# Patient Record
Sex: MALE | Race: WHITE
[De-identification: names, ages, dates, MRNs, and addresses within clinical notes are randomized per-mention and may not be internally consistent; named-entity substitution may affect disease eponyms.]

---

## 2017-03-06 ENCOUNTER — HOSPITAL ENCOUNTER (OUTPATIENT)
Dept: HOSPITAL 80 - BHFA | Age: 82
End: 2017-03-06
Attending: INTERNAL MEDICINE
Payer: COMMERCIAL

## 2017-03-06 DIAGNOSIS — I25.810: Primary | ICD-10-CM

## 2017-03-06 DIAGNOSIS — R06.02: ICD-10-CM

## 2017-03-06 DIAGNOSIS — N18.9: ICD-10-CM

## 2017-03-06 DIAGNOSIS — R53.83: ICD-10-CM

## 2017-03-06 DIAGNOSIS — I50.32: ICD-10-CM

## 2017-03-06 DIAGNOSIS — F32.9: ICD-10-CM

## 2017-03-06 DIAGNOSIS — R60.9: ICD-10-CM

## 2017-08-31 ENCOUNTER — HOSPITAL ENCOUNTER (OUTPATIENT)
Dept: HOSPITAL 80 - BHFA | Age: 82
End: 2017-08-31
Attending: INTERNAL MEDICINE
Payer: COMMERCIAL

## 2017-08-31 DIAGNOSIS — I25.10: ICD-10-CM

## 2017-08-31 DIAGNOSIS — I50.9: Primary | ICD-10-CM

## 2017-11-02 NOTE — GHP
[f rep st]



                                                       PREOP HISTORY AND PHYSICAL





DATE OF ADMISSION:  11/03/2017



CHIEF COMPLAINT:  Left inguinal hernia.



HISTORY OF PRESENT ILLNESS:  The patient is an 87-year-old who noticed a bulge in his left groin with
 pain while walking approximately 10 days ago.  He does have constant discomfort and awareness of the
 area.  He periodically has terrible pain that will go away within 3-5 minutes.  He does not have any
 difficulty with bowel or bladder habits.



PAST MEDICAL HISTORY:  Congestive heart failure, asthma, automatic implantable cardioverter-defibrill
ator in situ, chronic kidney disease, chronic pulmonary hypertension, chronic renal disease, history 
of myocardial infarction, hyperlipidemia, hypothyroidism, status post stents.



PAST SURGICAL HISTORY:  AICD placement, stents.



MEDICATIONS:  Aldactone, aspirin, Ativan, atorvastatin, Coreg, __________, Entresto, fluticasone, Las
ix, levothyroxine, Nitrostat, ProAir, Symbicort, Zoloft.



ALLERGIES:  No known drug allergies.



SOCIAL HISTORY:  He has never used tobacco.  He is a retired .



REVIEW OF SYSTEMS:  Decreasing exercise tolerance.  Otherwise, 10-point review of systems negative.



PHYSICAL EXAMINATION:  GENERAL:  Pleasant, well-nourished, well-groomed man.  Appears younger than st
ated age.  HEENT:  Normocephalic.  No gross hearing deficits.  Mucous membranes moist.  Pupils equal 
and round.  No scleral icterus.  LUNGS:  Clear to auscultation bilaterally.  CARDIAC:  Defibrillator 
in left upper chest.  Regular rate.  ABDOMEN:  Bowel sounds present.  Soft, nontender.  Bulge in left
 groin.  Cough impulse right groin.  SKIN:  Warm and dry.  MUSCULOSKELETAL:  Normal gait.  Normal olayinka
ls.  PSYCHIATRIC:  Mood and affect normal.  NEUROLOGIC:  Tremor.



IMPRESSION AND PLAN:  An 87-year-old with symptomatic left inguinal hernia and likely right inguinal 
hernia.  Due to his multiple comorbidities, I think open approach with sedation would be better for h
im than a laparoscopic repair.  Risks and benefits including, but not limited to, stroke, heart attac
k, death, blood clots, infection, bleeding, and damage to surrounding structures such as the bowel, b
ladder and testicle were discussed.





Job #:  [id number]

## 2017-11-02 NOTE — GHP
[f rep st]



                                                       PREOP HISTORY AND PHYSICAL





DATE OF ADMISSION:  11/03/2017



CHIEF COMPLAINT:  Left inguinal hernia.



HISTORY OF PRESENT ILLNESS:  The patient is an 87-year-old who noticed a bulge in his left groin with
 pain while walking approximately 10 days ago.  He does have constant discomfort and awareness of the
 area.  He periodically has terrible pain that will go away within 3-5 minutes.  He does not have any
 difficulty with bowel or bladder habits.



PAST MEDICAL HISTORY:  Congestive heart failure, asthma, automatic implantable cardioverter-defibrill
ator in situ, chronic kidney disease, chronic pulmonary hypertension, chronic renal disease, history 
of myocardial infarction, hyperlipidemia, hypothyroidism, status post stents.



PAST SURGICAL HISTORY:  AICD placement, stents.



MEDICATIONS:  Aldactone, aspirin, Ativan, atorvastatin, Coreg, __________, Entresto, fluticasone, Las
ix, levothyroxine, Nitrostat, ProAir, Symbicort, Zoloft.



ALLERGIES:  No known drug allergies.



SOCIAL HISTORY:  He has never used tobacco.  He is a retired .



REVIEW OF SYSTEMS:  Decreasing exercise tolerance.  Otherwise, 10-point review of systems negative.



PHYSICAL EXAMINATION:  GENERAL:  Pleasant, well-nourished, well-groomed man.  Appears younger than st
ated age.  HEENT:  Normocephalic.  No gross hearing deficits.  Mucous membranes moist.  Pupils equal 
and round.  No scleral icterus.  LUNGS:  Clear to auscultation bilaterally.  CARDIAC:  Defibrillator 
in left upper chest.  Regular rate.  ABDOMEN:  Bowel sounds present.  Soft, nontender.  Bulge in left
 groin.  Cough impulse right groin.  SKIN:  Warm and dry.  MUSCULOSKELETAL:  Normal gait.  Normal olayinka
ls.  PSYCHIATRIC:  Mood and affect normal.  NEUROLOGIC:  Tremor.



IMPRESSION AND PLAN:  An 87-year-old with symptomatic left inguinal hernia and likely right inguinal 
hernia.  Due to his multiple comorbidities, I think open approach with sedation would be better for h
im than a laparoscopic repair.  Risks and benefits including, but not limited to, stroke, heart attac
k, death, blood clots, infection, bleeding, and damage to surrounding structures such as the bowel, b
ladder and testicle were discussed.





Job #:  [id number]

## 2017-11-02 NOTE — GHP
[f rep st]



                                                       PREOP HISTORY AND PHYSICAL





DATE OF ADMISSION:  11/03/2017



CHIEF COMPLAINT:  Left inguinal hernia.



HISTORY OF PRESENT ILLNESS:  The patient is an 87-year-old who noticed a bulge in his left groin with
 pain while walking approximately 10 days ago.  He does have constant discomfort and awareness of the
 area.  He periodically has terrible pain that will go away within 3-5 minutes.  He does not have any
 difficulty with bowel or bladder habits.



PAST MEDICAL HISTORY:  Congestive heart failure, asthma, automatic implantable cardioverter-defibrill
ator in situ, chronic kidney disease, chronic pulmonary hypertension, chronic renal disease, history 
of myocardial infarction, hyperlipidemia, hypothyroidism, status post stents.



PAST SURGICAL HISTORY:  AICD placement, stents.



MEDICATIONS:  Aldactone, aspirin, Ativan, atorvastatin, Coreg, __________, Entresto, fluticasone, Las
ix, levothyroxine, Nitrostat, ProAir, Symbicort, Zoloft.



ALLERGIES:  No known drug allergies.



SOCIAL HISTORY:  He has never used tobacco.  He is a retired .



REVIEW OF SYSTEMS:  Decreasing exercise tolerance.  Otherwise, 10-point review of systems negative.



PHYSICAL EXAMINATION:  GENERAL:  Pleasant, well-nourished, well-groomed man.  Appears younger than st
ated age.  HEENT:  Normocephalic.  No gross hearing deficits.  Mucous membranes moist.  Pupils equal 
and round.  No scleral icterus.  LUNGS:  Clear to auscultation bilaterally.  CARDIAC:  Defibrillator 
in left upper chest.  Regular rate.  ABDOMEN:  Bowel sounds present.  Soft, nontender.  Bulge in left
 groin.  Cough impulse right groin.  SKIN:  Warm and dry.  MUSCULOSKELETAL:  Normal gait.  Normal olayinka
ls.  PSYCHIATRIC:  Mood and affect normal.  NEUROLOGIC:  Tremor.



IMPRESSION AND PLAN:  An 87-year-old with symptomatic left inguinal hernia and likely right inguinal 
hernia.  Due to his multiple comorbidities, I think open approach with sedation would be better for h
im than a laparoscopic repair.  Risks and benefits including, but not limited to, stroke, heart attac
k, death, blood clots, infection, bleeding, and damage to surrounding structures such as the bowel, b
ladder and testicle were discussed.





Job #:  [id number]

## 2017-11-03 ENCOUNTER — HOSPITAL ENCOUNTER (OUTPATIENT)
Dept: HOSPITAL 80 - FSGY | Age: 82
Discharge: HOME | End: 2017-11-03
Attending: SURGERY
Payer: COMMERCIAL

## 2017-11-03 VITALS
DIASTOLIC BLOOD PRESSURE: 55 MMHG | SYSTOLIC BLOOD PRESSURE: 114 MMHG | OXYGEN SATURATION: 99 % | RESPIRATION RATE: 16 BRPM

## 2017-11-03 VITALS — HEART RATE: 65 BPM

## 2017-11-03 VITALS — TEMPERATURE: 97.5 F

## 2017-11-03 DIAGNOSIS — E78.5: ICD-10-CM

## 2017-11-03 DIAGNOSIS — I25.10: ICD-10-CM

## 2017-11-03 DIAGNOSIS — I25.5: ICD-10-CM

## 2017-11-03 DIAGNOSIS — I27.20: ICD-10-CM

## 2017-11-03 DIAGNOSIS — K40.31: Primary | ICD-10-CM

## 2017-11-03 DIAGNOSIS — Z66: ICD-10-CM

## 2017-11-03 DIAGNOSIS — Z79.82: ICD-10-CM

## 2017-11-03 DIAGNOSIS — Z95.810: ICD-10-CM

## 2017-11-03 DIAGNOSIS — Z95.5: ICD-10-CM

## 2017-11-03 DIAGNOSIS — R10.32: ICD-10-CM

## 2017-11-03 DIAGNOSIS — I25.2: ICD-10-CM

## 2017-11-03 DIAGNOSIS — N18.3: ICD-10-CM

## 2017-11-03 DIAGNOSIS — F32.9: ICD-10-CM

## 2017-11-03 DIAGNOSIS — E03.9: ICD-10-CM

## 2017-11-03 DIAGNOSIS — I50.22: ICD-10-CM

## 2017-11-03 LAB — PLATELET # BLD: 136 10^3/UL (ref 150–400)

## 2017-11-03 PROCEDURE — 0YU60JZ SUPPLEMENT LEFT INGUINAL REGION WITH SYNTHETIC SUBSTITUTE, OPEN APPROACH: ICD-10-PCS | Performed by: SURGERY

## 2017-11-03 PROCEDURE — C1781 MESH (IMPLANTABLE): HCPCS

## 2017-11-03 NOTE — PDANEPAE
ANE History of Present Illness





Patient presents for open inguinal hernia repair





ANE Past Medical History





- Cardiovascular History


Hx Hypertension: No


Hx Arrhythmias: No


Hx Chest Pain: No


Hx Coronary Artery / Peripheral Vascular Disease: Yes


Hx CHF / Valvular Disease: No


Hx Palpitations: No


Cardiovascular History Comment: MI 1984





- Pulmonary History


Hx Asthma/Reactive Airway Disease: Yes


Hx Recent Upper Respiratory Infection: No


Hx Oxygen in Use at Home: No


Hx Sleep Apnea: No


Sleep Apnea Screening Result - Last Documented: Positive





- Neurologic History


Hx Cerebrovascular Accident: No


Hx Seizures: No


Hx Dementia: No





- Endocrine History


Hx Diabetes: No





- Renal History


Hx Renal Disorders: Yes


Renal History Comment: CRF





- Liver History


Hx Hepatic Disorders: No





- Neurological & Psychiatric Hx


Hx Neurological and Psychiatric Disorders: No





- Cancer History


Hx Cancer: No





- Congenital Disorder History


Hx Congenital Disorders: No





- GI History


Hx Gastrointestinal Disorders: No





- Other Health History


Other Health History: EASILY BRUISED





- Chronic Pain History


Chronic Pain: Yes





- Surgical History


Prior Surgeries: none





ANE Review of Systems


Review of Systems: 








- Exercise capacity


METS (RN): 4 METS





- Pacemaker


Pacemaker Type: Permanent Pacer/Defib


Pacemaker : Usentric


Pacemaker Mode: DDD


Pacemaker Set Rate: 65


Date Pacemaker Last Checked: 10/18/17





ANE Patient History





- Allergies


Allergies/Adverse Reactions: 








No Known Allergies Allergy (Verified 06/13/15 19:04)


 








- Home Medications


Home medications: home medication list seen and reviewed


Home Medications: 








Atorvastatin Calcium [Lipitor 80 mg] 80 mg PO DAILY@18 04/25/14 [Last Taken 08/ 31/16 06:00]


Cholecalciferol Vit D3 [Vitamin D3 (*)] 2,000 units PO DAILY 04/25/14 [Last 

Taken 08/30/16 06:00]


Clopidogrel Bisulfate [Plavix (*)] 75 mg PO DAILY@18 04/25/14 [Last Taken 08/30/ 16 06:00]


Multivitamins [Multivitamin (*)] 1 tab PO DAILY 04/25/14 [Last Taken 08/30/16 06

:00]


Omega-3 Fatty Acids [Fish Oil 1000 mg (*)] 1,000 mg PO DAILY 04/25/14 [Last 

Taken 08/30/16 06:00]


Albuterol [Proventil Inhaler HFA (*)] 2 puffs IH Q4 PRN 06/13/15 [Last Taken 08/ 29/16 13:00]


Calcium Carbonate [Oyster Shell Calcium 500 mg (*)] 500 mg PO DAILY 06/13/15 [

Last Taken 08/30/16 06:00]


Carvedilol [Coreg (*)] 3.125 mg PO BID@07,18 06/13/15 [Last Taken 08/31/16 06:00

]


Digoxin [Lanoxin 0.125 mg] 0.125 mg PO DAILY 06/13/15 [Last Taken 08/31/16 06:00

]


Fluticasone Nasal [Flonase Nasal Spray] 1 sprays EACHNARE BID 06/13/15 [Last 

Taken 08/31/16 06:00]


Spironolactone [Aldactone 25 MG (*)] 12.5 mg PO BIDDIUR 06/13/15 [Last Taken 08/ 31/16 06:00]


Aspirin EC [Aspirin EC 81 mg (*)] 81 mg PO DAILY 03/18/16 [Last Taken 08/24/16 

06:00]


Budesonide/Formoterol 160/4.5 [Symbicort 160-4.5 Mcg Inh (*)] 1 puffs IH BID 03/ 18/16 [Last Taken 08/31/16 06:00]


Furosemide [Lasix 20 MG (*)] 20 mg PO BIDDIUR 03/18/16 [Last Taken 08/30/16 12:

00]


LORazepam [Ativan (*)] 1 mg PO HS PRN 03/18/16 [Last Taken 08/30/16 23:00]


Nitroglycerin [Nitrostat 0.4 mg (*)] 0.4 mg SL Q5M PRN 03/18/16 [Last Taken 05/ 31/16 18:00]


Entresto 24 mg/26 mg (RX) 1 PO BID 08/31/16 [Last Taken 08/31/16 06:00]








- NPO status


NPO Status: no food or drink >8 hours





- Smoking Hx


Smoking Status: Never smoked





- Family Anes Hx


Family Hx Anesthesia Complications: none





ANE Labs/Vital Signs





- Vital Signs


Height: 175.26 cm


Weight: 63.503 kg





ANE Physical Exam





- Airway


Neck exam: FROM, decreased ROM


Mallampati Score: Class 2


Mouth exam: normal dental/mouth exam





- Pulmonary


Pulmonary: no respiratory distress





- Cardiovascular


Cardiovascular: regular rate and rhythym





- ASA Status


ASA Status: IV





ANE Anesthesia Plan


Anesthesia Plan: MAC (MAC, surgeon block. RBA discussed. )

## 2017-11-03 NOTE — PDANEPAE
ANE History of Present Illness





Patient presents for open inguinal hernia repair





ANE Past Medical History





- Cardiovascular History


Hx Hypertension: No


Hx Arrhythmias: No


Hx Chest Pain: No


Hx Coronary Artery / Peripheral Vascular Disease: Yes


Hx CHF / Valvular Disease: No


Hx Palpitations: No


Cardiovascular History Comment: MI 1984





- Pulmonary History


Hx Asthma/Reactive Airway Disease: Yes


Hx Recent Upper Respiratory Infection: No


Hx Oxygen in Use at Home: No


Hx Sleep Apnea: No


Sleep Apnea Screening Result - Last Documented: Positive





- Neurologic History


Hx Cerebrovascular Accident: No


Hx Seizures: No


Hx Dementia: No





- Endocrine History


Hx Diabetes: No





- Renal History


Hx Renal Disorders: Yes


Renal History Comment: CRF





- Liver History


Hx Hepatic Disorders: No





- Neurological & Psychiatric Hx


Hx Neurological and Psychiatric Disorders: No





- Cancer History


Hx Cancer: No





- Congenital Disorder History


Hx Congenital Disorders: No





- GI History


Hx Gastrointestinal Disorders: No





- Other Health History


Other Health History: EASILY BRUISED





- Chronic Pain History


Chronic Pain: Yes





- Surgical History


Prior Surgeries: none





ANE Review of Systems


Review of Systems: 








- Exercise capacity


METS (RN): 4 METS





- Pacemaker


Pacemaker Type: Permanent Pacer/Defib


Pacemaker : [employer]


Pacemaker Mode: DDD


Pacemaker Set Rate: 65


Date Pacemaker Last Checked: 10/18/17





ANE Patient History





- Allergies


Allergies/Adverse Reactions: 








No Known Allergies Allergy (Verified 06/13/15 19:04)


 








- Home Medications


Home medications: home medication list seen and reviewed


Home Medications: 








Atorvastatin Calcium [Lipitor 80 mg] 80 mg PO DAILY@18 04/25/14 [Last Taken 08/ 31/16 06:00]


Cholecalciferol Vit D3 [Vitamin D3 (*)] 2,000 units PO DAILY 04/25/14 [Last 

Taken 08/30/16 06:00]


Clopidogrel Bisulfate [Plavix (*)] 75 mg PO DAILY@18 04/25/14 [Last Taken 08/30/ 16 06:00]


Multivitamins [Multivitamin (*)] 1 tab PO DAILY 04/25/14 [Last Taken 08/30/16 06

:00]


Omega-3 Fatty Acids [Fish Oil 1000 mg (*)] 1,000 mg PO DAILY 04/25/14 [Last 

Taken 08/30/16 06:00]


Albuterol [Proventil Inhaler HFA (*)] 2 puffs IH Q4 PRN 06/13/15 [Last Taken 08/ 29/16 13:00]


Calcium Carbonate [Oyster Shell Calcium 500 mg (*)] 500 mg PO DAILY 06/13/15 [

Last Taken 08/30/16 06:00]


Carvedilol [Coreg (*)] 3.125 mg PO BID@07,18 06/13/15 [Last Taken 08/31/16 06:00

]


Digoxin [Lanoxin 0.125 mg] 0.125 mg PO DAILY 06/13/15 [Last Taken 08/31/16 06:00

]


Fluticasone Nasal [Flonase Nasal Spray] 1 sprays EACHNARE BID 06/13/15 [Last 

Taken 08/31/16 06:00]


Spironolactone [Aldactone 25 MG (*)] 12.5 mg PO BIDDIUR 06/13/15 [Last Taken 08/ 31/16 06:00]


Aspirin EC [Aspirin EC 81 mg (*)] 81 mg PO DAILY 03/18/16 [Last Taken 08/24/16 

06:00]


Budesonide/Formoterol 160/4.5 [Symbicort 160-4.5 Mcg Inh (*)] 1 puffs IH BID 03/ 18/16 [Last Taken 08/31/16 06:00]


Furosemide [Lasix 20 MG (*)] 20 mg PO BIDDIUR 03/18/16 [Last Taken 08/30/16 12:

00]


LORazepam [Ativan (*)] 1 mg PO HS PRN 03/18/16 [Last Taken 08/30/16 23:00]


Nitroglycerin [Nitrostat 0.4 mg (*)] 0.4 mg SL Q5M PRN 03/18/16 [Last Taken 05/ 31/16 18:00]


Entresto 24 mg/26 mg (RX) 1 PO BID 08/31/16 [Last Taken 08/31/16 06:00]








- NPO status


NPO Status: no food or drink >8 hours





- Smoking Hx


Smoking Status: Never smoked





- Family Anes Hx


Family Hx Anesthesia Complications: none





ANE Labs/Vital Signs





- Vital Signs


Height: 175.26 cm


Weight: 63.503 kg





ANE Physical Exam





- Airway


Neck exam: FROM, decreased ROM


Mallampati Score: Class 2


Mouth exam: normal dental/mouth exam





- Pulmonary


Pulmonary: no respiratory distress





- Cardiovascular


Cardiovascular: regular rate and rhythym





- ASA Status


ASA Status: IV





ANE Anesthesia Plan


Anesthesia Plan: MAC (MAC, surgeon block. RBA discussed. )

## 2017-11-03 NOTE — POSTOPPROG
Post Op Note


Date of Operation: 11/03/17


Surgeon: Dahiana Hernandez


Assistant: levon


Anesthesiologist: angie


Anesthesia: GET(General Endotracheal)


Pre-op Diagnosis: left inguinal hernia


Post-op Diagnosis: same


Indication: 88 yo with painful left inguinal hernia 


Procedure: open left inguinal hernia with mesh


Findings: indirect inguinal hernia


Inf/Abcess present in the surg proc area at time of surgery?: No


EBL: Minimal

## 2017-11-03 NOTE — PDANEPAE
ANE History of Present Illness





Patient presents for open inguinal hernia repair





ANE Past Medical History





- Cardiovascular History


Hx Hypertension: No


Hx Arrhythmias: No


Hx Chest Pain: No


Hx Coronary Artery / Peripheral Vascular Disease: Yes


Hx CHF / Valvular Disease: No


Hx Palpitations: No


Cardiovascular History Comment: MI 1984





- Pulmonary History


Hx Asthma/Reactive Airway Disease: Yes


Hx Recent Upper Respiratory Infection: No


Hx Oxygen in Use at Home: No


Hx Sleep Apnea: No


Sleep Apnea Screening Result - Last Documented: Positive





- Neurologic History


Hx Cerebrovascular Accident: No


Hx Seizures: No


Hx Dementia: No





- Endocrine History


Hx Diabetes: No





- Renal History


Hx Renal Disorders: Yes


Renal History Comment: CRF





- Liver History


Hx Hepatic Disorders: No





- Neurological & Psychiatric Hx


Hx Neurological and Psychiatric Disorders: No





- Cancer History


Hx Cancer: No





- Congenital Disorder History


Hx Congenital Disorders: No





- GI History


Hx Gastrointestinal Disorders: No





- Other Health History


Other Health History: EASILY BRUISED





- Chronic Pain History


Chronic Pain: Yes





- Surgical History


Prior Surgeries: none





ANE Review of Systems


Review of Systems: 








- Exercise capacity


METS (RN): 4 METS





- Pacemaker


Pacemaker Type: Permanent Pacer/Defib


Pacemaker : [employer]


Pacemaker Mode: DDD


Pacemaker Set Rate: 65


Date Pacemaker Last Checked: 10/18/17





ANE Patient History





- Allergies


Allergies/Adverse Reactions: 








No Known Allergies Allergy (Verified 06/13/15 19:04)


 








- Home Medications


Home medications: home medication list seen and reviewed


Home Medications: 








Atorvastatin Calcium [Lipitor 80 mg] 80 mg PO DAILY@18 04/25/14 [Last Taken 08/ 31/16 06:00]


Cholecalciferol Vit D3 [Vitamin D3 (*)] 2,000 units PO DAILY 04/25/14 [Last 

Taken 08/30/16 06:00]


Clopidogrel Bisulfate [Plavix (*)] 75 mg PO DAILY@18 04/25/14 [Last Taken 08/30/ 16 06:00]


Multivitamins [Multivitamin (*)] 1 tab PO DAILY 04/25/14 [Last Taken 08/30/16 06

:00]


Omega-3 Fatty Acids [Fish Oil 1000 mg (*)] 1,000 mg PO DAILY 04/25/14 [Last 

Taken 08/30/16 06:00]


Albuterol [Proventil Inhaler HFA (*)] 2 puffs IH Q4 PRN 06/13/15 [Last Taken 08/ 29/16 13:00]


Calcium Carbonate [Oyster Shell Calcium 500 mg (*)] 500 mg PO DAILY 06/13/15 [

Last Taken 08/30/16 06:00]


Carvedilol [Coreg (*)] 3.125 mg PO BID@07,18 06/13/15 [Last Taken 08/31/16 06:00

]


Digoxin [Lanoxin 0.125 mg] 0.125 mg PO DAILY 06/13/15 [Last Taken 08/31/16 06:00

]


Fluticasone Nasal [Flonase Nasal Spray] 1 sprays EACHNARE BID 06/13/15 [Last 

Taken 08/31/16 06:00]


Spironolactone [Aldactone 25 MG (*)] 12.5 mg PO BIDDIUR 06/13/15 [Last Taken 08/ 31/16 06:00]


Aspirin EC [Aspirin EC 81 mg (*)] 81 mg PO DAILY 03/18/16 [Last Taken 08/24/16 

06:00]


Budesonide/Formoterol 160/4.5 [Symbicort 160-4.5 Mcg Inh (*)] 1 puffs IH BID 03/ 18/16 [Last Taken 08/31/16 06:00]


Furosemide [Lasix 20 MG (*)] 20 mg PO BIDDIUR 03/18/16 [Last Taken 08/30/16 12:

00]


LORazepam [Ativan (*)] 1 mg PO HS PRN 03/18/16 [Last Taken 08/30/16 23:00]


Nitroglycerin [Nitrostat 0.4 mg (*)] 0.4 mg SL Q5M PRN 03/18/16 [Last Taken 05/ 31/16 18:00]


Entresto 24 mg/26 mg (RX) 1 PO BID 08/31/16 [Last Taken 08/31/16 06:00]








- NPO status


NPO Status: no food or drink >8 hours





- Smoking Hx


Smoking Status: Never smoked





- Family Anes Hx


Family Hx Anesthesia Complications: none





ANE Labs/Vital Signs





- Vital Signs


Height: 175.26 cm


Weight: 63.503 kg





ANE Physical Exam





- Airway


Neck exam: FROM, decreased ROM


Mallampati Score: Class 2


Mouth exam: normal dental/mouth exam





- Pulmonary


Pulmonary: no respiratory distress





- Cardiovascular


Cardiovascular: regular rate and rhythym





- ASA Status


ASA Status: IV





ANE Anesthesia Plan


Anesthesia Plan: MAC (MAC, surgeon block. RBA discussed. )

## 2017-11-03 NOTE — POSTOPPROG
Post Op Note


Date of Operation: 11/03/17


Surgeon: Dahiana Hernandez


Assistant: levon


Anesthesiologist: angie


Anesthesia: GET(General Endotracheal)


Pre-op Diagnosis: left inguinal hernia


Post-op Diagnosis: same


Indication: 86 yo with painful left inguinal hernia 


Procedure: open left inguinal hernia with mesh


Findings: indirect inguinal hernia


Inf/Abcess present in the surg proc area at time of surgery?: No


EBL: Minimal

## 2018-01-05 ENCOUNTER — HOSPITAL ENCOUNTER (OUTPATIENT)
Dept: HOSPITAL 80 - BHFA | Age: 83
End: 2018-01-05
Attending: INTERNAL MEDICINE
Payer: COMMERCIAL

## 2018-01-05 DIAGNOSIS — I50.23: Primary | ICD-10-CM

## 2018-01-05 DIAGNOSIS — R06.02: ICD-10-CM

## 2018-01-05 DIAGNOSIS — I25.10: ICD-10-CM

## 2018-02-09 ENCOUNTER — HOSPITAL ENCOUNTER (OUTPATIENT)
Dept: HOSPITAL 80 - FCATH | Age: 83
Discharge: HOME | End: 2018-02-09
Attending: INTERNAL MEDICINE
Payer: COMMERCIAL

## 2018-02-09 DIAGNOSIS — Z95.1: ICD-10-CM

## 2018-02-09 DIAGNOSIS — Z45.02: Primary | ICD-10-CM

## 2018-02-09 DIAGNOSIS — Z95.5: ICD-10-CM

## 2018-02-09 DIAGNOSIS — I25.10: ICD-10-CM

## 2018-02-09 LAB
INR PPP: 1.02 (ref 0.83–1.16)
PLATELET # BLD: 163 10^3/UL (ref 150–400)
PROTHROMBIN TIME: 13.6 SEC (ref 12–15)

## 2018-02-09 PROCEDURE — 0JPT0PZ REMOVAL OF CARDIAC RHYTHM RELATED DEVICE FROM TRUNK SUBCUTANEOUS TISSUE AND FASCIA, OPEN APPROACH: ICD-10-PCS | Performed by: INTERNAL MEDICINE

## 2018-02-09 PROCEDURE — 0JH608Z INSERTION OF DEFIBRILLATOR GENERATOR INTO CHEST SUBCUTANEOUS TISSUE AND FASCIA, OPEN APPROACH: ICD-10-PCS | Performed by: INTERNAL MEDICINE

## 2018-02-09 PROCEDURE — 02H63KZ INSERTION OF DEFIBRILLATOR LEAD INTO RIGHT ATRIUM, PERCUTANEOUS APPROACH: ICD-10-PCS | Performed by: INTERNAL MEDICINE

## 2018-02-09 PROCEDURE — 02HK3KZ INSERTION OF DEFIBRILLATOR LEAD INTO RIGHT VENTRICLE, PERCUTANEOUS APPROACH: ICD-10-PCS | Performed by: INTERNAL MEDICINE

## 2018-02-09 PROCEDURE — C1721 AICD, DUAL CHAMBER: HCPCS

## 2018-02-09 NOTE — CPEKG
Heart Rate: 65

RR Interval: 923

P-R Interval: 200

QRSD Interval: 154

QT Interval: 448

QTC Interval: 466

QRS Axis: -81

T Wave Axis: 80

EKG Severity - ABNORMAL ECG -

EKG Impression: ATRIAL-PACED RHYTHM

EKG Impression: NONSPECIFIC IVCD WITH LAD

EKG Impression: LEFT VENTRICULAR HYPERTROPHY

Electronically Signed By: Paresh Nye 10-Feb-2018 08:20:12

## 2018-02-09 NOTE — PDCTREPORT
Cardiothoracic Procedure Rpt


Cardiothoracic Procedure Report: 


Procedure:  ICD generator change.


After obtaining informed consent patient was brought to the cardiac 

catheterization lab in the fasting state.  The old pacemaker site in the left 

subclavian fossa was sterilely prepped and draped.  Scar was infiltrated with 2

% xylocaine.  Using a 10 blade an incision was made through the old scar.  

Using a combination of sharp and blunt dissection the Bovie catheter the old 

pocket was entered.  The device delivered to the field.  Leads were removed 

from the old device.  Each lead was tested.  Appropriate sensitivities and 

thresholds were confirmed.  Pocket was copiously irrigated with bacitracin.  

The new device was delivered to the field.  Leads were attached confirming 

serial numbers.  Setscrews were tightened per industry standards.  The device 

was curled into the pocket.  The old pocket was closed using 2 0 Vicryl.  

Subcutaneous layer was closed using 3 0 Vicryl.  The strata Fix was used to 

close the skin.  Pressure dressing was applied the patient is taken to recovery 

for continued care.  The explanted device is a Lumax 540 DR-T serial [id number]. [id number].


The new device is a Itrevia 7 DR-T DF-[id number]








Atrial lead is a Setrox S 45 serial 2.  [id number] P waves are 4 mV.  Capture 0.7 

volts with a pulse with a 0.4 milliseconds.  Impedance 457 Ohms.  Right 

ventricular lead is a LINOX serial [id number]. [id number]. Right ventricular sensing was 

11.9 mV.  Capture was 0.7 volts with a pulse with a 0.4 milliseconds.  743 Ohms.


Conclusion successful ICD generator change.














Patient Problems: 


 Problems











Problem Status Onset


 


Pneumonia Acute  


 


CHF (congestive heart failure) Acute  


 


Coronary arteriosclerosis in native artery Acute  


 


S/P coronary artery stent placement Acute  


 


Cardiomyopathy Acute  


 


Chronic Disease Mgmt/Transitional Care Acute

## 2018-02-09 NOTE — CPEKG
Heart Rate: 71

RR Interval: 845

P-R Interval: 236

QRSD Interval: 158

QT Interval: 440

QTC Interval: 479

QRS Axis: -77

T Wave Axis: 104

EKG Severity - ABNORMAL ECG -

EKG Impression: ATRIAL-PACED COMPLEXES

EKG Impression: VENTRICULAR PREMATURE COMPLEX

EKG Impression: FIRST DEGREE AV BLOCK

EKG Impression: NONSPECIFIC IVCD WITH LAD

EKG Impression: LEFT VENTRICULAR HYPERTROPHY

Electronically Signed By: Paresh Nye 10-Feb-2018 08:20:07

## 2018-07-30 ENCOUNTER — HOSPITAL ENCOUNTER (OUTPATIENT)
Dept: HOSPITAL 80 - BMCIMAGING | Age: 83
End: 2018-07-30
Attending: FAMILY MEDICINE
Payer: COMMERCIAL

## 2018-07-30 ENCOUNTER — HOSPITAL ENCOUNTER (OUTPATIENT)
Dept: HOSPITAL 80 - BHFA | Age: 83
End: 2018-07-30
Attending: INTERNAL MEDICINE
Payer: COMMERCIAL

## 2018-07-30 DIAGNOSIS — R07.81: ICD-10-CM

## 2018-07-30 DIAGNOSIS — M54.9: Primary | ICD-10-CM

## 2018-07-30 DIAGNOSIS — I51.7: ICD-10-CM

## 2018-07-30 DIAGNOSIS — I42.9: Primary | ICD-10-CM

## 2018-07-30 DIAGNOSIS — Z95.0: ICD-10-CM

## 2018-07-30 DIAGNOSIS — I05.9: ICD-10-CM

## 2018-09-12 ENCOUNTER — HOSPITAL ENCOUNTER (EMERGENCY)
Dept: HOSPITAL 80 - FED | Age: 83
Discharge: HOME | End: 2018-09-12
Payer: COMMERCIAL

## 2018-09-12 DIAGNOSIS — J44.9: ICD-10-CM

## 2018-09-12 DIAGNOSIS — Z95.0: ICD-10-CM

## 2018-09-12 DIAGNOSIS — I44.7: ICD-10-CM

## 2018-09-12 DIAGNOSIS — Z95.818: ICD-10-CM

## 2018-09-12 DIAGNOSIS — I50.9: Primary | ICD-10-CM

## 2018-09-12 LAB
INR PPP: 1.1 (ref 0.83–1.16)
PLATELET # BLD: 134 10^3/UL (ref 150–400)
PROTHROMBIN TIME: 14.4 SEC (ref 12–15)

## 2018-09-12 NOTE — EDPHY
H & P


Stated Complaint: SOB for several months getting progressively worse


Time Seen by Provider: 09/12/18 19:32


HPI/ROS: 





CHIEF COMPLAINT:  CHF





HISTORY OF PRESENT ILLNESS:  The patient is an 87-year-old man with a history 

of COPD and CHF as well as coronary artery disease status post stents 

pacemaker.  He denies history of atrial fibrillation and is not on 

anticoagulation.  He has had persistent shortness of breath especially with 

exertion over the last several months.  He has been seen by his cardiologist 

Dr. Smith as well as Dr. Graham who did a catheterization last month.  The 

catheterization revealed stable coronary artery disease with patent stents.  No 

further interventions were done.  Patient states that his symptoms have 

continued and that tonight he could not even walk to the end of the block 

without becoming very winded.  He does have edema in his lower extremities with 

states that is baseline.  No recent fevers or infections.  No chest pain.  No 

pain when his symptoms happened.  He does not have shortness of breath at rest.

  His Lasix was increased about 3 months ago but has not been since.


Severity:  Moderate


Modifying factors:  Exertion





REVIEW OF SYSTEMS:


Constitutional:  denies: chills, fever, recent illness, recent injury


EENTM: denies: blurred vision, double vision, nose congestion


Respiratory:  See HPI


Cardiac: denies: chest pain, irregular heart rate, lightheadedness, palpitations


Gastrointestinal/Abdominal: denies: abdominal pain, diarrhea, nausea, vomiting, 

blood streaked stools


Genitourinary: denies: dysuria, frequency, hematuria, pain


Musculoskeletal: denies: joint pain, muscle pain


Skin: denies: lesions, rash, jaundice, bruising


Neurological: denies: headache, numbness, paresthesia, tingling, dizziness, 

weakness


Hematologic/Lymphatic: denies: blood clots, easy bleeding, easy bruising


Immunologic/allergic: denies: HIV/AIDS, transplant


 10 systems reviewed and negative except as noted





EXAM:


GENERAL:  Well-appearing, well-nourished and in no acute distress.


HEAD:  Atraumatic, normocephalic.


EYES:  Pupils equal round and reactive to light, extraocular movements intact, 

sclera anicteric, conjunctiva are normal.


ENT:  TMs normal, nares patent, oropharynx clear without exudates.  Moist 

mucous membranes.


NECK:  Normal range of motion, supple without lymphadenopathy or JVD.


LUNGS:  Breath sounds clear to auscultation bilaterally and equal.  No wheezes 

rales or rhonchi.  No wheezing


HEART:  Regular rate and rhythm without murmurs, rubs or gallops.


ABDOMEN:  Soft, nontender, normoactive bowel sounds.  No guarding, no rebound.  

No masses appreciated. 


BACK:  No CVA tenderness, no spinal tenderness, step-offs or deformities


EXTREMITIES:  Normal range of motion, 1+ edema which she states is baseline.  

No clubbing or cyanosis.


NEUROLOGICAL:  Cranial nerves II through XII grossly intact.  Normal speech, 

normal gait.  5/5 strength, normal movement in all extremities, normal sensation

, normal reflexes


PSYCH:  Normal mood, normal affect.


SKIN:  Warm, dry, normal turgor, no visible rashes or lesions.








Source: Patient


Exam Limitations: No limitations





- Personal History


Tetanus Vaccine Date: 2007





- Medical/Surgical History


Hx Asthma: Yes


Hx Chronic Respiratory Disease: No


Hx Diabetes: No


Hx Cardiac Disease: Yes


Hx Renal Disease: No


Hx Cirrhosis: No


Hx Alcoholism: No


Hx HIV/AIDS: No


Hx Splenectomy or Spleen Trauma: No


Other PMH: PACER, STENTS, ASTHMA, chf, bilateral thoracentesis, MI, Dysphagia/

aspiration PNA, mucous plugging, afib, asthma, COPD





- Family History


Significant Family History: No pertinent family hx





- Social History


Smoking Status: Never smoked


Alcohol Use: Sober


Drug Use: None


Constitutional: 


 Initial Vital Signs











Temperature (C)  36.6 C   09/12/18 17:46


 


Heart Rate  82   09/12/18 17:46


 


Respiratory Rate  18   09/12/18 17:46


 


Blood Pressure  105/65   09/12/18 17:46


 


O2 Sat (%)  95   09/12/18 17:46








 











O2 Delivery Mode               Room Air














Allergies/Adverse Reactions: 


 





No Known Allergies Allergy (Verified 09/12/18 17:44)


 








Home Medications: 














 Medication  Instructions  Recorded


 


Cholecalciferol Vit D3 [Vitamin D3 2,000 units PO DAILY 04/25/14





(*)]  


 


Multivitamins [Multivitamin (*)] 1 tab PO DAILY 04/25/14


 


Omega-3 Fatty Acids [Fish Oil 1000 1,000 mg PO DAILY 04/25/14





mg (*)]  


 


Albuterol [Proventil Inhaler HFA 2 puffs IH Q4 PRN 06/13/15





(*)]  


 


Calcium Carbonate [Oyster Shell 500 mg PO DAILY 06/13/15





Calcium 500 mg (*)]  


 


Fluticasone Nasal [Flonase Nasal 1 sprays EACHNARE DAILY 06/13/15





Spray]  


 


Spironolactone [Aldactone 25 MG 12.5 mg PO DAILY 06/13/15





(*)]  


 


Aspirin EC [Aspirin EC 81 mg (*)] 81 mg PO DAILY 03/18/16


 


Budesonide/Formoterol 160/4.5 1 puffs IH DAILY 03/18/16





[Symbicort 160-4.5 Mcg Inh (*)]  


 


LORazepam [Ativan (*)] 1 mg PO HS PRN 03/18/16


 


Nitroglycerin [Nitrostat 0.4 mg 0.4 mg SL Q5M PRN 03/18/16





(*)]  


 


Acetaminophen [Tylenol 325mg (*)] 325 mg PO Q4 PRN #0 tab 03/19/16


 


Atorvastatin Calcium [Lipitor 40 40 mg PO DAILY18 02/02/18





mg (*)]  


 


Carvedilol [Coreg (*)] 6.25 mg PO BIDMEAL 02/02/18


 


Furosemide [Lasix 20 MG (*)] 60 mg PO DAILY 02/02/18


 


Furosemide [Lasix 40 MG (*)] 40 mg PO DAILY@12 02/02/18


 


Levothyroxine [Synthroid 25 mcg 25 mcg PO DAILY18 02/02/18





(*)]  


 


Sertraline HCl [Zoloft 25mg (*)] 25 mg PO DAILY 02/02/18


 


Coreg  09/12/18


 


Digoxin  09/12/18














Medical Decision Making





- Diagnostics


EKG Interpretation: 





An EKG obtained and was read and documented in trace view.  Please see trace 

view for full reading and report.  Sinus rhythm, left bundle branch block, 

similar to previous 


Imaging: Discussed imaging studies w/ On call Radiologist


ED Course/Re-evaluation: 





8:20 p.m. Patient's D-dimer is age adjusted negative.  He does not appear to be 

in any distress.  He is saturating 93% on room air.  We are awaiting x-ray and 

remainder of lab results.





9:00 p.m. The patient he is feeling well.  He is eager to go home.  I plan to 

give him an extra dose of Lasix here but wife would prefer to give it to him at 

home.  I encouraged him to increase the Lasix by 20 mg each day until you 

follow up with Dr. Smith.  I discussed the plan with Dr. Whitaker who is in 

agreement.


Differential Diagnosis: 





Partial list of the Differential diagnosis considered include but were not 

limited to;  CHF exacerbation, COPD and although unlikely based on the history 

and physical exam, I also considered pneumonia, pneumothorax, PE.  I discussed 

these differential diagnoses and the plan with the patient as well as the usual 

and expected course.  The patient understands that the diagnosis is provisional 

and that in medicine we are not always correct and that further workup is often 

warranted.  Usual and customary warnings were given.  All of the patient's 

questions were answered.  The patient was instructed to return to the emergency 

department should the symptoms at all worsen or return, otherwise to followup 

with the physician as we discussed.





- Data Points


Laboratory Results: 


 Laboratory Results





 09/12/18 19:51 





 09/12/18 19:51 








Point of Care Test Results: 


 Chemistry











  09/12/18





  19:55


 


POC Troponin I  0.05 ng/mL ng/mL





   (0.00-0.08) 














Departure





- Departure


Disposition: Home, Routine, Self-Care


Clinical Impression: 


CHF (congestive heart failure)


Qualifiers:


 Heart failure type: unspecified Heart failure chronicity: chronic Qualified 

Code(s): I50.9 - Heart failure, unspecified





Condition: Fair


Instructions:  Heart Failure (ED)


Referrals: 


Ivon Osullivan MD [Primary Care Provider] - As per Instructions

## 2018-09-12 NOTE — CPEKG
Test Reason : OPEN

Blood Pressure : ***/*** mmHG

Vent. Rate : 079 BPM     Atrial Rate : 079 BPM

   P-R Int : 221 ms          QRS Dur : 162 ms

    QT Int : 399 ms       P-R-T Axes : 094 -69 104 degrees

   QTc Int : 458 ms

 

Sinus rhythm

Prolonged PA interval

Probable left atrial enlargement

Left bundle branch block

 

Confirmed by Mp Rosenberg ([id number]) on 9/12/2018 7:59:05 PM

 

Referred By:             Confirmed By:Mp Rosenberg

## 2018-09-13 VITALS — SYSTOLIC BLOOD PRESSURE: 134 MMHG | DIASTOLIC BLOOD PRESSURE: 75 MMHG
